# Patient Record
Sex: MALE | Race: WHITE | NOT HISPANIC OR LATINO | Employment: FULL TIME | URBAN - METROPOLITAN AREA
[De-identification: names, ages, dates, MRNs, and addresses within clinical notes are randomized per-mention and may not be internally consistent; named-entity substitution may affect disease eponyms.]

---

## 2024-03-31 ENCOUNTER — HOSPITAL ENCOUNTER (EMERGENCY)
Facility: CLINIC | Age: 29
Discharge: HOME | End: 2024-03-31
Attending: EMERGENCY MEDICINE | Admitting: EMERGENCY MEDICINE
Payer: COMMERCIAL

## 2024-03-31 VITALS
DIASTOLIC BLOOD PRESSURE: 58 MMHG | RESPIRATION RATE: 18 BRPM | HEART RATE: 58 BPM | WEIGHT: 180 LBS | BODY MASS INDEX: 25.2 KG/M2 | HEIGHT: 71 IN | OXYGEN SATURATION: 95 % | TEMPERATURE: 98.1 F | SYSTOLIC BLOOD PRESSURE: 120 MMHG

## 2024-03-31 DIAGNOSIS — K50.90 ACUTE CROHN'S DISEASE WITHOUT COMPLICATION (H): ICD-10-CM

## 2024-03-31 DIAGNOSIS — L50.9 URTICARIA: ICD-10-CM

## 2024-03-31 PROBLEM — K50.00 CROHN'S DISEASE OF SMALL INTESTINE WITHOUT COMPLICATION (H): Status: ACTIVE | Noted: 2019-08-26

## 2024-03-31 LAB
ANION GAP SERPL CALCULATED.3IONS-SCNC: 9 MMOL/L (ref 7–15)
BUN SERPL-MCNC: 21.7 MG/DL (ref 6–20)
CALCIUM SERPL-MCNC: 9.4 MG/DL (ref 8.6–10)
CHLORIDE SERPL-SCNC: 102 MMOL/L (ref 98–107)
CREAT SERPL-MCNC: 1 MG/DL (ref 0.67–1.17)
CRP SERPL-MCNC: 4.01 MG/L
DEPRECATED HCO3 PLAS-SCNC: 26 MMOL/L (ref 22–29)
EGFRCR SERPLBLD CKD-EPI 2021: >90 ML/MIN/1.73M2
GLUCOSE SERPL-MCNC: 129 MG/DL (ref 70–99)
POTASSIUM SERPL-SCNC: 4.5 MMOL/L (ref 3.4–5.3)
SODIUM SERPL-SCNC: 137 MMOL/L (ref 135–145)

## 2024-03-31 PROCEDURE — 36415 COLL VENOUS BLD VENIPUNCTURE: CPT | Performed by: EMERGENCY MEDICINE

## 2024-03-31 PROCEDURE — 99284 EMERGENCY DEPT VISIT MOD MDM: CPT

## 2024-03-31 PROCEDURE — 86140 C-REACTIVE PROTEIN: CPT | Performed by: EMERGENCY MEDICINE

## 2024-03-31 PROCEDURE — 80048 BASIC METABOLIC PNL TOTAL CA: CPT | Performed by: EMERGENCY MEDICINE

## 2024-03-31 RX ORDER — EPINEPHRINE 0.3 MG/.3ML
0.3 INJECTION SUBCUTANEOUS
Qty: 2 EACH | Refills: 0 | Status: SHIPPED | OUTPATIENT
Start: 2024-03-31

## 2024-03-31 RX ORDER — PREDNISONE 10 MG/1
TABLET ORAL
Qty: 140 TABLET | Refills: 0 | Status: SHIPPED | OUTPATIENT
Start: 2024-03-31 | End: 2024-05-26

## 2024-03-31 RX ORDER — CETIRIZINE HYDROCHLORIDE 10 MG/1
10 TABLET ORAL 2 TIMES DAILY PRN
Qty: 20 TABLET | Refills: 0 | Status: SHIPPED | OUTPATIENT
Start: 2024-03-31 | End: 2024-04-10

## 2024-03-31 ASSESSMENT — ACTIVITIES OF DAILY LIVING (ADL)
ADLS_ACUITY_SCORE: 35
ADLS_ACUITY_SCORE: 35

## 2024-03-31 ASSESSMENT — ENCOUNTER SYMPTOMS
JOINT SWELLING: 1
SORE THROAT: 0
COUGH: 0
ABDOMINAL PAIN: 1
RHINORRHEA: 0
DIARRHEA: 1
NAUSEA: 1
FEVER: 1
VOMITING: 0

## 2024-03-31 ASSESSMENT — COLUMBIA-SUICIDE SEVERITY RATING SCALE - C-SSRS
6. HAVE YOU EVER DONE ANYTHING, STARTED TO DO ANYTHING, OR PREPARED TO DO ANYTHING TO END YOUR LIFE?: NO
2. HAVE YOU ACTUALLY HAD ANY THOUGHTS OF KILLING YOURSELF IN THE PAST MONTH?: NO
1. IN THE PAST MONTH, HAVE YOU WISHED YOU WERE DEAD OR WISHED YOU COULD GO TO SLEEP AND NOT WAKE UP?: NO

## 2024-03-31 NOTE — ED PROVIDER NOTES
EMERGENCY DEPARTMENT ENCOUNTER      NAME: Ahsan Webster  AGE: 29 year old male  YOB: 1995  MRN: [id number]  EVALUATION DATE & TIME: 3/31/2024  3:32 PM    PCP: No primary care provider on file.    ED PROVIDER: Osman Marrufo MD      Chief Complaint   Patient presents with    Abdominal Pain    Hives         FINAL IMPRESSION:  1. Urticaria    2. Acute Crohn's disease without complication (H)          ED COURSE & MEDICAL DECISION MAKING:    3:48 PM I met with the patient for the initial interview and physical examination. Discussed plan for treatment and workup in the ED.    4:41 PM Spoke with [facility].   4:54 PM I rechecked and updated the patient. I discussed the plan for discharge with the patient, and patient is agreeable. We discussed supportive cares at home and reasons for return to the ER including new or worsening symptoms - all questions and concerns addressed. Patient to be discharged by RN.     Pertinent Labs & Imaging studies reviewed. (See chart for details)  29 year old male presents to the Emergency Department for evaluation of pruritic rash that comes and goes.  Patient did show me some photographs of the rash and it shows some raised skin colored wheals with irregular lesion    No petechia or purpura on exam.  Well-appearing.  Patient is having some loose stools.  Denies constipation or vomiting or abdominal distention    Fevers earlier in the week which resolved    No abdominal tenderness    Rash consistent with urticaria    ED Course as of 03/31/24 1704   Sun Mar 31, 2024   1656 Patient cannot leave a stool sample here   1701 History of Crohn's is on Remicade last infusion 4 weeks ago.  Presents because he developed rash today.  Currently denies any abdominal pain or vomiting.  Normal stooling today.  Yesterday however he was worried he may have had a bowel obstruction.  That seems to have resolved.   1701 Clinically he has no abdominal tenderness or guarding or  rigidity.  Based on history and exam I do not suspect bowel obstruction or abscess therefore I do not feel CT imaging is indicated.  Spoke with [facility] who did speak with patient earlier today and they recommend 8-week prednisone taper 40 mg followed by 30 followed by 20 followed by 10   1701 Prednisone taper ordered.  I did order C. difficile test which patient states he cannot leave currently.  On exam he does not have evidence of anaphylaxis or angioedema.  Did have some food allergy as a child.  He does have evidence of some mild urticaria.  Will prescribe Zyrtec twice a day.  Recommend follow-up primary care doctor if no improvement for consideration of allergy testing.  Will prescribe epinephrine autoinjector as well in case symptoms worsen but that is unlikely   1702 CRP Inflammation: 4.01   1702 Creatinine: 1.00   1702 GFR Estimate: >90   1702 Sodium: 137   1702 Denies URI symptoms.  Gastroenteritis secondary to viral process less likely.  Appendicitis unlikely       Medical Decision Making  Obtained supplemental history:Supplemental history obtained?: Documented in chart and Family Member/Significant Other  Reviewed external records: External records reviewed?: Documented in chart and Outpatient Record: 1/18/24 [facility] Visit  Care impacted by chronic illness:Other: Crohn's disease  Care significantly affected by social determinants of health:Access to Medical Care  Did you consider but not order tests?: Work up considered but not performed and documented in chart, if applicable  Did you interpret images independently?: Independent interpretation of ECG and images noted in documentation, when applicable.  Consultation discussion with other provider:Did you involve another provider (consultant, , pharmacy, etc.)?: I discussed the care with another health care provider, see documentation for details.  Discharge. I prescribed additional prescription strength medication(s) as charted. I considered  admission, but ultimately discharged patient with reassuring labs and discussion with GI for prednisone taper.    At the conclusion of the encounter I discussed the results of all of the tests and the disposition. The questions were answered. The patient or family acknowledged understanding and was agreeable with the care plan.         MEDICATIONS GIVEN IN THE EMERGENCY:  Medications - No data to display    NEW PRESCRIPTIONS STARTED AT TODAY'S ER VISIT  New Prescriptions    CETIRIZINE (ZYRTEC) 10 MG TABLET    Take 1 tablet (10 mg) by mouth 2 times daily as needed for allergies (1 tab up to twice a day as needed for itch, rash, hives, allergy)    EPINEPHRINE (ANY BX GENERIC EQUIV) 0.3 MG/0.3ML INJECTION 2-PACK    Inject 0.3 mLs (0.3 mg) into the muscle once as needed for anaphylaxis May repeat one time in 5-15 minutes if response to initial dose is inadequate.    PREDNISONE (DELTASONE) 10 MG TABLET    Take 4 tablets (40 mg) by mouth daily for 14 days, THEN 3 tablets (30 mg) daily for 14 days, THEN 2 tablets (20 mg) daily for 14 days, THEN 1 tablet (10 mg) daily for 14 days.          =================================================================    HPI    Patient information was obtained from: the patient     Use of : N/A         Ahsan Webster is a 29 year old male with a pertinent history of Crohn's disease who presents to this ED via walk in for evaluation of a rash.     Per Chart Review,  1/18/24 The patient presented to [facility] for evaluation of Crohn's disease. He was diagnosed in 2019 and initially treated with budesonide and then started on Remicade. After a small bowel obstruction in 2020 his Remicade was increased to 7.5 mg/kg and he was started on azathioprine. He reported ongoing symptoms following his infusion cycles, so his infliximab was increased to 7.5 mg/kg. He was scheduled for a colonoscopy in March 2024.     The patient reports that he developed hives last night, which resolved  with benadryl. Today around 1000 while he was at work, his hives returned. The hives have been moving, they initially were on his thighs, but is now located on his lower legs and feet. He endorses foot swelling. He took some of his old prednisone today, and has had 60 mg. He states that he has never had hives with a Crohn's flare before, but notes that he did have a flare at the end of last week with diarrhea, abdominal pain, fever, and nausea, all of which have now resolved. He called his GI provider, who recommended he be seen in the ED today, and the patient will be seen in clinic tomorrow. His last Remicade infusion was 4 weeks ago. The patient denies any recent sick contacts, swelling of the lips or tongue, vomiting, cough, rhinorrhea, sore throat, and any other symptoms or complaints at this time.     REVIEW OF SYSTEMS   Review of Systems   Constitutional:  Positive for fever (resolved).   HENT:  Negative for rhinorrhea and sore throat.         Negative for lip or tongue swelling   Respiratory:  Negative for cough.    Gastrointestinal:  Positive for abdominal pain (resolved), diarrhea (resolved) and nausea (resolved). Negative for vomiting.   Musculoskeletal:  Positive for joint swelling (bilateral foot swelling).   Skin:  Positive for rash (hives on lower legs and feet).        PAST MEDICAL HISTORY:  History reviewed. No pertinent past medical history.    PAST SURGICAL HISTORY:  History reviewed. No pertinent surgical history.        CURRENT MEDICATIONS:    cetirizine (ZYRTEC) 10 MG tablet  EPINEPHrine (ANY BX GENERIC EQUIV) 0.3 MG/0.3ML injection 2-pack  predniSONE (DELTASONE) 10 MG tablet        ALLERGIES:  Allergies   Allergen Reactions    Nuts Other (See Comments)    Peanut-Containing Drug Products Anaphylaxis    Azithromycin Other (See Comments) and Unknown     Unknown reaction    Chicken-Derived Products (Egg) Unknown     showed on testing flu shot ok    showed on testing flu shot ok   showed on testing  "flu shot ok    Diatrizoate Nausea and Vomiting     Nausea and vomiting directly after IV injection of contrast    Doxycycline Hives    Gadobutrol Nausea     Pt got a short wave of nausea following Gadavist administration.  Did not interrupt exam.  Please push gadavist slowly when possible.  [facility], RT(R)(MR)       FAMILY HISTORY:  History reviewed. No pertinent family history.    SOCIAL HISTORY:        VITALS:  /57   Pulse 57   Temp 98.1  F (36.7  C) (Temporal)   Resp 18   Ht 1.803 m (5' 11\")   Wt 81.6 kg (180 lb)   SpO2 98%   BMI 25.10 kg/m      PHYSICAL EXAM      Vitals: /57   Pulse 57   Temp 98.1  F (36.7  C) (Temporal)   Resp 18   Ht 1.803 m (5' 11\")   Wt 81.6 kg (180 lb)   SpO2 98%   BMI 25.10 kg/m    General: Appears in no acute distress, awake, alert, interactive.  Eyes: Conjunctivae non-injected. Sclera anicteric.  HENT: Atraumatic.  Neck: Supple.  Respiratory/Chest: Respiration unlabored.  Abdomen: non distended, soft, non tender.  No guarding or rigidity  Musculoskeletal: Normal extremities. No edema or erythema.  Skin:  Urticaria to bilateral feet and calves.   Neurologic: Face symmetric, moves all extremities spontaneously. Speech clear.  Psychiatric: Oriented to person, place, and time. Affect appropriate.    LAB:  All pertinent labs reviewed and interpreted.  Results for orders placed or performed during the hospital encounter of 03/31/24   Result Value Ref Range    CRP Inflammation 4.01 <5.00 mg/L   Basic metabolic panel   Result Value Ref Range    Sodium 137 135 - 145 mmol/L    Potassium 4.5 3.4 - 5.3 mmol/L    Chloride 102 98 - 107 mmol/L    Carbon Dioxide (CO2) 26 22 - 29 mmol/L    Anion Gap 9 7 - 15 mmol/L    Urea Nitrogen 21.7 (H) 6.0 - 20.0 mg/dL    Creatinine 1.00 0.67 - 1.17 mg/dL    GFR Estimate >90 >60 mL/min/1.73m2    Calcium 9.4 8.6 - 10.0 mg/dL    Glucose 129 (H) 70 - 99 mg/dL       RADIOLOGY:  Reviewed all pertinent imaging. Please see official radiology " report.  No orders to display       EKG:    None.     PROCEDURES:   None        I, Kelsey Saenz am serving as a scribe to document services personally performed by Osman Marrufo MD, based on my observation and the provider's statements to me. I, Osman Marrufo MD attest that Kelsey Saenz is acting in a scribe capacity, has observed my performance of the services and has documented them in accordance with my direction.     Osman Marrufo MD  [facility] EMERGENCY ROOM  [address] [id number] [phone]       Osman Marrufo MD  03/31/24 0407

## 2024-03-31 NOTE — DISCHARGE INSTRUCTIONS
For your presumed Crohn's flare recommend drop off a C. difficile sample with your primary care doctor or your GI doctor.  Recommend 8-week prednisone taper.  Call your GI doctor this week.  Return to the ER for worsening symptoms.  For your hives recommend Zyrtec twice daily as needed.  If develop difficulty breathing or throat swelling or difficulty swallowing use epinephrine and call 911

## 2024-03-31 NOTE — ED TRIAGE NOTES
Pt presents to ED with Crohn's flare-up earlier this week, GI symptoms have since improved but patient has now developed hives on his lower extremities and seems to be moving toward upper extremities. NO Benadryl taken. Did take some old Prednisone. Has well formed bowel movement today. Currently denies abdominal pain. Denies any facial or throat swelling.      Triage Assessment (Adult)       Row Name 03/31/24 1500          Triage Assessment    Airway WDL WDL        Respiratory WDL    Respiratory WDL WDL        Skin Circulation/Temperature WDL    Skin Circulation/Temperature WDL WDL        Cardiac WDL    Cardiac WDL WDL        Peripheral/Neurovascular WDL    Peripheral Neurovascular WDL WDL        Cognitive/Neuro/Behavioral WDL    Cognitive/Neuro/Behavioral WDL WDL

## 2024-04-04 ENCOUNTER — APPOINTMENT (OUTPATIENT)
Dept: CT IMAGING | Facility: CLINIC | Age: 29
End: 2024-04-04
Attending: EMERGENCY MEDICINE
Payer: COMMERCIAL

## 2024-04-04 ENCOUNTER — HOSPITAL ENCOUNTER (EMERGENCY)
Facility: CLINIC | Age: 29
Discharge: HOME | End: 2024-04-04
Attending: EMERGENCY MEDICINE | Admitting: EMERGENCY MEDICINE
Payer: COMMERCIAL

## 2024-04-04 VITALS
SYSTOLIC BLOOD PRESSURE: 123 MMHG | TEMPERATURE: 98.3 F | BODY MASS INDEX: 25.2 KG/M2 | OXYGEN SATURATION: 98 % | HEART RATE: 58 BPM | WEIGHT: 180 LBS | RESPIRATION RATE: 18 BRPM | DIASTOLIC BLOOD PRESSURE: 68 MMHG | HEIGHT: 71 IN

## 2024-04-04 DIAGNOSIS — K59.00 CONSTIPATION, UNSPECIFIED CONSTIPATION TYPE: ICD-10-CM

## 2024-04-04 LAB
ALBUMIN SERPL BCG-MCNC: 4.6 G/DL (ref 3.5–5.2)
ALP SERPL-CCNC: 70 U/L (ref 40–150)
ALT SERPL W P-5'-P-CCNC: 19 U/L (ref 0–70)
ANION GAP SERPL CALCULATED.3IONS-SCNC: 8 MMOL/L (ref 7–15)
AST SERPL W P-5'-P-CCNC: 24 U/L (ref 0–45)
BASOPHILS # BLD AUTO: 0 10E3/UL (ref 0–0.2)
BASOPHILS NFR BLD AUTO: 0 %
BILIRUB DIRECT SERPL-MCNC: <0.2 MG/DL (ref 0–0.3)
BILIRUB SERPL-MCNC: 0.3 MG/DL
BUN SERPL-MCNC: 16.6 MG/DL (ref 6–20)
CALCIUM SERPL-MCNC: 9.3 MG/DL (ref 8.6–10)
CHLORIDE SERPL-SCNC: 103 MMOL/L (ref 98–107)
CREAT SERPL-MCNC: 1.11 MG/DL (ref 0.67–1.17)
DEPRECATED HCO3 PLAS-SCNC: 27 MMOL/L (ref 22–29)
EGFRCR SERPLBLD CKD-EPI 2021: >90 ML/MIN/1.73M2
EOSINOPHIL # BLD AUTO: 0.1 10E3/UL (ref 0–0.7)
EOSINOPHIL NFR BLD AUTO: 1 %
ERYTHROCYTE [DISTWIDTH] IN BLOOD BY AUTOMATED COUNT: 12.7 % (ref 10–15)
GLUCOSE SERPL-MCNC: 110 MG/DL (ref 70–99)
HCT VFR BLD AUTO: 42 % (ref 40–53)
HGB BLD-MCNC: 14.3 G/DL (ref 13.3–17.7)
IMM GRANULOCYTES # BLD: 0.1 10E3/UL
IMM GRANULOCYTES NFR BLD: 1 %
LIPASE SERPL-CCNC: 21 U/L (ref 13–60)
LYMPHOCYTES # BLD AUTO: 1.9 10E3/UL (ref 0.8–5.3)
LYMPHOCYTES NFR BLD AUTO: 20 %
MCH RBC QN AUTO: 29.1 PG (ref 26.5–33)
MCHC RBC AUTO-ENTMCNC: 34 G/DL (ref 31.5–36.5)
MCV RBC AUTO: 86 FL (ref 78–100)
MONOCYTES # BLD AUTO: 0.6 10E3/UL (ref 0–1.3)
MONOCYTES NFR BLD AUTO: 6 %
NEUTROPHILS # BLD AUTO: 6.6 10E3/UL (ref 1.6–8.3)
NEUTROPHILS NFR BLD AUTO: 72 %
NRBC # BLD AUTO: 0 10E3/UL
NRBC BLD AUTO-RTO: 0 /100
PLATELET # BLD AUTO: 335 10E3/UL (ref 150–450)
POTASSIUM SERPL-SCNC: 4.2 MMOL/L (ref 3.4–5.3)
PROT SERPL-MCNC: 7 G/DL (ref 6.4–8.3)
RBC # BLD AUTO: 4.91 10E6/UL (ref 4.4–5.9)
SODIUM SERPL-SCNC: 138 MMOL/L (ref 135–145)
WBC # BLD AUTO: 9.2 10E3/UL (ref 4–11)

## 2024-04-04 PROCEDURE — 99285 EMERGENCY DEPT VISIT HI MDM: CPT | Mod: 25

## 2024-04-04 PROCEDURE — 96361 HYDRATE IV INFUSION ADD-ON: CPT

## 2024-04-04 PROCEDURE — 85049 AUTOMATED PLATELET COUNT: CPT | Performed by: EMERGENCY MEDICINE

## 2024-04-04 PROCEDURE — 83690 ASSAY OF LIPASE: CPT | Performed by: EMERGENCY MEDICINE

## 2024-04-04 PROCEDURE — 96374 THER/PROPH/DIAG INJ IV PUSH: CPT

## 2024-04-04 PROCEDURE — 82248 BILIRUBIN DIRECT: CPT | Performed by: EMERGENCY MEDICINE

## 2024-04-04 PROCEDURE — 80053 COMPREHEN METABOLIC PANEL: CPT | Performed by: EMERGENCY MEDICINE

## 2024-04-04 PROCEDURE — 74176 CT ABD & PELVIS W/O CONTRAST: CPT

## 2024-04-04 PROCEDURE — 258N000003 HC RX IP 258 OP 636: Performed by: EMERGENCY MEDICINE

## 2024-04-04 PROCEDURE — 36415 COLL VENOUS BLD VENIPUNCTURE: CPT | Performed by: EMERGENCY MEDICINE

## 2024-04-04 PROCEDURE — 250N000011 HC RX IP 250 OP 636: Performed by: EMERGENCY MEDICINE

## 2024-04-04 RX ORDER — ONDANSETRON 2 MG/ML
4 INJECTION INTRAMUSCULAR; INTRAVENOUS ONCE
Status: COMPLETED | OUTPATIENT
Start: 2024-04-04 | End: 2024-04-04

## 2024-04-04 RX ADMIN — SODIUM CHLORIDE 1000 ML: 9 INJECTION, SOLUTION INTRAVENOUS at 15:49

## 2024-04-04 RX ADMIN — ONDANSETRON 4 MG: 2 INJECTION INTRAMUSCULAR; INTRAVENOUS at 15:48

## 2024-04-04 ASSESSMENT — COLUMBIA-SUICIDE SEVERITY RATING SCALE - C-SSRS
6. HAVE YOU EVER DONE ANYTHING, STARTED TO DO ANYTHING, OR PREPARED TO DO ANYTHING TO END YOUR LIFE?: NO
1. IN THE PAST MONTH, HAVE YOU WISHED YOU WERE DEAD OR WISHED YOU COULD GO TO SLEEP AND NOT WAKE UP?: NO
2. HAVE YOU ACTUALLY HAD ANY THOUGHTS OF KILLING YOURSELF IN THE PAST MONTH?: NO

## 2024-04-04 NOTE — ED PROVIDER NOTES
EMERGENCY DEPARTMENT ENCOUNTER      NAME: Ahsan Webster  AGE: 29 year old male  YOB: 1995  MRN: [id number]  EVALUATION DATE & TIME: No admission date for patient encounter.    PCP: Cy Styles    ED PROVIDER: Irwin Eckert D.O.      Chief Complaint   Patient presents with    Abdominal Pain       FINAL IMPRESSION:  1. Constipation, unspecified constipation type        ED COURSE & MEDICAL DECISION MAKING:    3:26 PM I met with the patient to gather history and to perform my initial exam. I discussed the plan for care while in the Emergency Department.  6:07 PM Rechecked and updated patient and offered an enema which he declined.         Pertinent Labs & Imaging studies reviewed. (See chart for details)  29 year old male presents to the Emergency Department for evaluation of abdominal pain.  Patient has known history of Crohn's.  Differential does include Crohn's flare, constipation, bowel obstruction, volvulus, mesenteric ischemia, other acute process.  Patient was just seen for a Crohn's flare, and is currently on prednisone for such, doubt this to represent repeat Crohn's flare.  CT imaging does not show any evidence of bowel obstruction, volvulus, mesenteric ischemia, or inflammatory process or surgical abdomen.  There was a moderate amount of stool in the colon, this patient has not had a bowel movement since Sunday, I suspect this to represent constipation.  Therefore at this time, I do not believe the patient requires admission.  I did offer a enema in the emergency department, but the patient requested that I discharge with prescription for enema to do at home.  He will follow-up as an outpatient with his gastroenterologist.  Return precautions were discussed.    Medical Decision Making  Obtained supplemental history:Supplemental history obtained?: Documented in chart  Reviewed external records: External records reviewed?: Documented in chart  Care impacted by chronic  illness:Other: Crohn's disease  Care significantly affected by social determinants of health:N/A  Did you consider but not order tests?: Work up considered but not performed and documented in chart, if applicable  Did you interpret images independently?: Independent interpretation of ECG and images noted in documentation, when applicable.  Consultation discussion with other provider:Did you involve another provider (consultant, , pharmacy, etc.)?: No  Discharge. I prescribed additional prescription strength medication(s) as charted. See documentation for any additional details.    At the conclusion of the encounter I discussed the results of all of the tests and the disposition. The questions were answered. The patient or family acknowledged understanding and was agreeable with the care plan.        HPI    Patient information was obtained from: patient    Use of : N/A       Ahsan Webster is a 29 year old male who presents abdominal pain.    Patient reports diffuse abdominal pain and some nausea. He hasn't had a BM in 4 days and has a history of obstructions. He was seen here last week for a Crohn's flare up with diarrhea and started on prednisone. Patient called [facility] today and they recommended he come to the ED and get a CT. He has had nausea and vomiting with IV contrast in the past.  Patient denies any vomiting, fever, or lightheadedness.    Per Chart Review: 3/31/24 ED visit at [facility] for abdominal pain, diarrhea, and hives. Diagnosed with an acute Crohn's flare. Started on 8 week prednisone taper and prescribed zyrtec.      REVIEW OF SYSTEMS  Constitutional:  Denies fever, chills, weight loss or weakness  Eyes:  No pain, discharge, redness  HENT:  Denies sore throat, ear pain, congestion  Respiratory: No SOB, wheeze or cough  Cardiovascular:  No CP, palpitations  GI:  Denies vomiting, diarrhea. Abdominal pain and nausea.  : Denies dysuria, hematuria  Musculoskeletal:  Denies any new  muscle/joint pain, swelling or loss of function.  Skin:  Denies rash, pallor  Neurologic:  Denies headache, focal weakness or sensory changes  Lymph: Denies swollen nodes    All other systems negative unless noted in HPI.    PAST MEDICAL HISTORY:  No past medical history on file.    PAST SURGICAL HISTORY:  No past surgical history on file.      CURRENT MEDICATIONS:    No current facility-administered medications for this encounter.     Current Outpatient Medications   Medication Sig Dispense Refill    sodium phosphate (FLEET ENEMA) 7-19 GM/118ML rectal enema Place 1 Bottle (1 enema) rectally once for 1 dose 133 mL 0    cetirizine (ZYRTEC) 10 MG tablet Take 1 tablet (10 mg) by mouth 2 times daily as needed for allergies (1 tab up to twice a day as needed for itch, rash, hives, allergy) 20 tablet 0    EPINEPHrine (ANY BX GENERIC EQUIV) 0.3 MG/0.3ML injection 2-pack Inject 0.3 mLs (0.3 mg) into the muscle once as needed for anaphylaxis May repeat one time in 5-15 minutes if response to initial dose is inadequate. 2 each 0    predniSONE (DELTASONE) 10 MG tablet Take 4 tablets (40 mg) by mouth daily for 14 days, THEN 3 tablets (30 mg) daily for 14 days, THEN 2 tablets (20 mg) daily for 14 days, THEN 1 tablet (10 mg) daily for 14 days. 140 tablet 0         ALLERGIES:  Allergies   Allergen Reactions    Nuts Other (See Comments)    Peanut-Containing Drug Products Anaphylaxis    Azithromycin Other (See Comments) and Unknown     Unknown reaction    Chicken-Derived Products (Egg) Unknown     showed on testing flu shot ok    showed on testing flu shot ok   showed on testing flu shot ok    Diatrizoate Nausea and Vomiting     Nausea and vomiting directly after IV injection of contrast    Doxycycline Hives    Gadobutrol Nausea     Pt got a short wave of nausea following Gadavist administration.  Did not interrupt exam.  Please push gadavist slowly when possible.  [facility], RT(R)(MR)       FAMILY HISTORY:  No family history on  "file.    SOCIAL HISTORY:  Social History     Socioeconomic History    Marital status: Single       VITALS:  Patient Vitals for the past 24 hrs:   BP Temp Temp src Pulse Resp SpO2 Height Weight   04/04/24 1813 123/68 98.3  F (36.8  C) Oral 58 18 98 % -- --   04/04/24 1523 125/68 98.6  F (37  C) Temporal 59 16 96 % 1.803 m (5' 11\") 81.6 kg (180 lb)       PHYSICAL EXAM    VITAL SIGNS: /68   Pulse 58   Temp 98.3  F (36.8  C) (Oral)   Resp 18   Ht 1.803 m (5' 11\")   Wt 81.6 kg (180 lb)   SpO2 98%   BMI 25.10 kg/m      General Appearance: Well-appearing, well-nourished, no acute distress   Head:  Normocephalic, without obvious abnormality, atraumatic  Eyes:  PERRL, conjunctiva/corneas clear, EOM's intact,  ENT:  Lips, mucosa, and tongue normal, membranes are moist without pallor  Neck:  Normal ROM, symmetrical, trachea midline    Cardio:  Regular rate and rhythm, no murmur, rub or gallop, 2+ pulses symmetric in all extremities  Pulm:  Clear to auscultation bilaterally, respirations unlabored,  Abdomen:  Soft, no rebound or guarding. Mild diffuse tenderness.  Musculoskeletal: Full ROM, no edema, no cyanosis, good ROM of major joints  Integument:  Warm, Dry, No erythema, No rash.    Neurologic:  Alert & oriented.  No focal deficits appreciated.  Ambulatory.  Psychiatric:  Affect normal, Judgment normal, Mood normal.      LABS  Results for orders placed or performed during the hospital encounter of 04/04/24 (from the past 24 hour(s))   CBC with platelets + differential    Narrative    The following orders were created for panel order CBC with platelets + differential.  Procedure                               Abnormality         Status                     ---------                               -----------         ------                     CBC with platelets and d...[[id number]]                      Final result                 Please view results for these tests on the individual orders.   Basic metabolic panel "   Result Value Ref Range    Sodium 138 135 - 145 mmol/L    Potassium 4.2 3.4 - 5.3 mmol/L    Chloride 103 98 - 107 mmol/L    Carbon Dioxide (CO2) 27 22 - 29 mmol/L    Anion Gap 8 7 - 15 mmol/L    Urea Nitrogen 16.6 6.0 - 20.0 mg/dL    Creatinine 1.11 0.67 - 1.17 mg/dL    GFR Estimate >90 >60 mL/min/1.73m2    Calcium 9.3 8.6 - 10.0 mg/dL    Glucose 110 (H) 70 - 99 mg/dL   Hepatic function panel   Result Value Ref Range    Protein Total 7.0 6.4 - 8.3 g/dL    Albumin 4.6 3.5 - 5.2 g/dL    Bilirubin Total 0.3 <=1.2 mg/dL    Alkaline Phosphatase 70 40 - 150 U/L    AST 24 0 - 45 U/L    ALT 19 0 - 70 U/L    Bilirubin Direct <0.20 0.00 - 0.30 mg/dL   Lipase   Result Value Ref Range    Lipase 21 13 - 60 U/L   CBC with platelets and differential   Result Value Ref Range    WBC Count 9.2 4.0 - 11.0 10e3/uL    RBC Count 4.91 4.40 - 5.90 10e6/uL    Hemoglobin 14.3 13.3 - 17.7 g/dL    Hematocrit 42.0 40.0 - 53.0 %    MCV 86 78 - 100 fL    MCH 29.1 26.5 - 33.0 pg    MCHC 34.0 31.5 - 36.5 g/dL    RDW 12.7 10.0 - 15.0 %    Platelet Count 335 150 - 450 10e3/uL    % Neutrophils 72 %    % Lymphocytes 20 %    % Monocytes 6 %    % Eosinophils 1 %    % Basophils 0 %    % Immature Granulocytes 1 %    NRBCs per 100 WBC 0 <1 /100    Absolute Neutrophils 6.6 1.6 - 8.3 10e3/uL    Absolute Lymphocytes 1.9 0.8 - 5.3 10e3/uL    Absolute Monocytes 0.6 0.0 - 1.3 10e3/uL    Absolute Eosinophils 0.1 0.0 - 0.7 10e3/uL    Absolute Basophils 0.0 0.0 - 0.2 10e3/uL    Absolute Immature Granulocytes 0.1 <=0.4 10e3/uL    Absolute NRBCs 0.0 10e3/uL   CT Abdomen Pelvis w/o Contrast    Narrative    EXAM: CT ABDOMEN PELVIS W/O CONTRAST  LOCATION: [facility]  DATE: 4/4/2024    INDICATION: Abdominal pain with concern for obstruction  COMPARISON: Chest x-ray 12/03/2023  TECHNIQUE: CT scan of the abdomen and pelvis was performed without IV contrast. Multiplanar reformats were obtained. Dose reduction techniques were used.  CONTRAST:  None.    FINDINGS:   LOWER CHEST: Normal.    HEPATOBILIARY: Normal.    PANCREAS: Normal.    SPLEEN: Normal.    ADRENAL GLANDS: Normal.    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 No calculi or obstruction.    BOWEL: No inflammatory changes. Bowel is of normal caliber. Appendix and terminal ileum are unremarkable. Redundant colon. Moderate stool burden.    LYMPH NODES: Normal.    VASCULATURE: Normal.    PELVIC ORGANS: Normal.    MUSCULOSKELETAL: Unremarkable.      Impression    IMPRESSION:   1.  No abnormalities are seen to explain pain.  2.  Specifically, no renal calculi, obstruction or inflammatory changes.   3.  Appendix and terminal ileum are normal.  4.  Moderate stool burden.  5.  Ptotic right kidney.           RADIOLOGY  CT Abdomen Pelvis w/o Contrast   Final Result   IMPRESSION:    1.  No abnormalities are seen to explain pain.   2.  Specifically, no renal calculi, obstruction or inflammatory changes.    3.  Appendix and terminal ileum are normal.   4.  Moderate stool burden.   5.  Ptotic right kidney.                  MEDICATIONS GIVEN IN THE EMERGENCY:  Medications   ondansetron (ZOFRAN) injection 4 mg (4 mg Intravenous $Given 4/4/24 5604)   sodium chloride 0.9% BOLUS 1,000 mL (0 mLs Intravenous Stopped 4/4/24 1657)       NEW PRESCRIPTIONS STARTED AT TODAY'S ER VISIT  Discharge Medication List as of 4/4/2024  6:14 PM        START taking these medications    Details   sodium phosphate (FLEET ENEMA) 7-19 GM/118ML rectal enema Place 1 Bottle (1 enema) rectally once for 1 dose, Disp-133 mL, R-0, Local Print              I, Charity Vick, am serving as a scribe to document services personally performed by Irwin Eckert D.O., based on my observations and the provider's statements to me.  I, Irwin Eckert D.O., attest that Charity Vick is acting in a scribe capacity, has observed my performance of the services and has documented them  in accordance with my direction.     Irwin Eckert D.O.  Emergency Medicine  [facility] EMERGENCY ROOM  [address] [id number] [phone]  Dept: [phone]       Irwin Eckert DO  04/04/24 1909

## 2024-04-04 NOTE — ED TRIAGE NOTES
History of Chrons and seen here on Sunday. Prescribed prednisone and had hives. No CT scan done at that time. Pain has continued and called GI provider and sent here for CT scan.  Pain minimal. No BM x 4 days.  No nausea or vomiting at present. History of obstructions.      Triage Assessment (Adult)       Row Name 04/04/24 1525          Triage Assessment    Airway WDL WDL        Respiratory WDL    Respiratory WDL WDL